# Patient Record
Sex: FEMALE | Race: OTHER | HISPANIC OR LATINO | Employment: FULL TIME | URBAN - METROPOLITAN AREA
[De-identification: names, ages, dates, MRNs, and addresses within clinical notes are randomized per-mention and may not be internally consistent; named-entity substitution may affect disease eponyms.]

---

## 2022-12-03 ENCOUNTER — HOSPITAL ENCOUNTER (EMERGENCY)
Facility: MEDICAL CENTER | Age: 42
End: 2022-12-03
Attending: EMERGENCY MEDICINE
Payer: COMMERCIAL

## 2022-12-03 VITALS
HEIGHT: 64 IN | TEMPERATURE: 98 F | OXYGEN SATURATION: 98 % | RESPIRATION RATE: 16 BRPM | BODY MASS INDEX: 26.12 KG/M2 | HEART RATE: 69 BPM | SYSTOLIC BLOOD PRESSURE: 113 MMHG | WEIGHT: 153 LBS | DIASTOLIC BLOOD PRESSURE: 59 MMHG

## 2022-12-03 DIAGNOSIS — R10.9 ABDOMINAL CRAMPS: ICD-10-CM

## 2022-12-03 DIAGNOSIS — R10.30 LOWER ABDOMINAL PAIN: ICD-10-CM

## 2022-12-03 DIAGNOSIS — N80.03 ADENOMYOSIS: ICD-10-CM

## 2022-12-03 LAB
ALBUMIN SERPL BCP-MCNC: 4 G/DL (ref 3.2–4.9)
ALBUMIN/GLOB SERPL: 1.5 G/DL
ALP SERPL-CCNC: 69 U/L (ref 30–99)
ALT SERPL-CCNC: 11 U/L (ref 2–50)
ANION GAP SERPL CALC-SCNC: 12 MMOL/L (ref 7–16)
APPEARANCE UR: CLEAR
AST SERPL-CCNC: 22 U/L (ref 12–45)
BACTERIA #/AREA URNS HPF: NEGATIVE /HPF
BASOPHILS # BLD AUTO: 0.4 % (ref 0–1.8)
BASOPHILS # BLD: 0.04 K/UL (ref 0–0.12)
BILIRUB SERPL-MCNC: 0.4 MG/DL (ref 0.1–1.5)
BILIRUB UR QL STRIP.AUTO: NEGATIVE
BUN SERPL-MCNC: 5 MG/DL (ref 8–22)
CALCIUM SERPL-MCNC: 8.9 MG/DL (ref 8.5–10.5)
CHLORIDE SERPL-SCNC: 107 MMOL/L (ref 96–112)
CO2 SERPL-SCNC: 21 MMOL/L (ref 20–33)
COLOR UR: YELLOW
CREAT SERPL-MCNC: 0.52 MG/DL (ref 0.5–1.4)
EOSINOPHIL # BLD AUTO: 0.21 K/UL (ref 0–0.51)
EOSINOPHIL NFR BLD: 2.3 % (ref 0–6.9)
EPI CELLS #/AREA URNS HPF: ABNORMAL /HPF
ERYTHROCYTE [DISTWIDTH] IN BLOOD BY AUTOMATED COUNT: 42.5 FL (ref 35.9–50)
GFR SERPLBLD CREATININE-BSD FMLA CKD-EPI: 120 ML/MIN/1.73 M 2
GLOBULIN SER CALC-MCNC: 2.7 G/DL (ref 1.9–3.5)
GLUCOSE SERPL-MCNC: 88 MG/DL (ref 65–99)
GLUCOSE UR STRIP.AUTO-MCNC: NEGATIVE MG/DL
HCG SERPL QL: NEGATIVE
HCT VFR BLD AUTO: 42 % (ref 37–47)
HGB BLD-MCNC: 14 G/DL (ref 12–16)
IMM GRANULOCYTES # BLD AUTO: 0.03 K/UL (ref 0–0.11)
IMM GRANULOCYTES NFR BLD AUTO: 0.3 % (ref 0–0.9)
KETONES UR STRIP.AUTO-MCNC: ABNORMAL MG/DL
LEUKOCYTE ESTERASE UR QL STRIP.AUTO: NEGATIVE
LIPASE SERPL-CCNC: 21 U/L (ref 11–82)
LYMPHOCYTES # BLD AUTO: 1.73 K/UL (ref 1–4.8)
LYMPHOCYTES NFR BLD: 19.2 % (ref 22–41)
MCH RBC QN AUTO: 29.7 PG (ref 27–33)
MCHC RBC AUTO-ENTMCNC: 33.3 G/DL (ref 33.6–35)
MCV RBC AUTO: 89 FL (ref 81.4–97.8)
MICRO URNS: ABNORMAL
MONOCYTES # BLD AUTO: 0.6 K/UL (ref 0–0.85)
MONOCYTES NFR BLD AUTO: 6.6 % (ref 0–13.4)
NEUTROPHILS # BLD AUTO: 6.42 K/UL (ref 2–7.15)
NEUTROPHILS NFR BLD: 71.2 % (ref 44–72)
NITRITE UR QL STRIP.AUTO: NEGATIVE
NRBC # BLD AUTO: 0 K/UL
NRBC BLD-RTO: 0 /100 WBC
PH UR STRIP.AUTO: 5.5 [PH] (ref 5–8)
PLATELET # BLD AUTO: 301 K/UL (ref 164–446)
PMV BLD AUTO: 9.1 FL (ref 9–12.9)
POTASSIUM SERPL-SCNC: 3.8 MMOL/L (ref 3.6–5.5)
PROCALCITONIN SERPL-MCNC: <0.05 NG/ML
PROT SERPL-MCNC: 6.7 G/DL (ref 6–8.2)
PROT UR QL STRIP: NEGATIVE MG/DL
RBC # BLD AUTO: 4.72 M/UL (ref 4.2–5.4)
RBC # URNS HPF: ABNORMAL /HPF
RBC UR QL AUTO: ABNORMAL
RENAL EPI CELLS #/AREA URNS HPF: NEGATIVE /HPF
SODIUM SERPL-SCNC: 140 MMOL/L (ref 135–145)
SP GR UR STRIP.AUTO: >=1.03
UROBILINOGEN UR STRIP.AUTO-MCNC: 0.2 MG/DL
WBC # BLD AUTO: 9 K/UL (ref 4.8–10.8)
WBC #/AREA URNS HPF: ABNORMAL /HPF

## 2022-12-03 PROCEDURE — 96374 THER/PROPH/DIAG INJ IV PUSH: CPT

## 2022-12-03 PROCEDURE — 83690 ASSAY OF LIPASE: CPT

## 2022-12-03 PROCEDURE — 700105 HCHG RX REV CODE 258: Performed by: EMERGENCY MEDICINE

## 2022-12-03 PROCEDURE — 84703 CHORIONIC GONADOTROPIN ASSAY: CPT

## 2022-12-03 PROCEDURE — 36415 COLL VENOUS BLD VENIPUNCTURE: CPT

## 2022-12-03 PROCEDURE — 84145 PROCALCITONIN (PCT): CPT

## 2022-12-03 PROCEDURE — 80053 COMPREHEN METABOLIC PANEL: CPT

## 2022-12-03 PROCEDURE — 96375 TX/PRO/DX INJ NEW DRUG ADDON: CPT

## 2022-12-03 PROCEDURE — 81001 URINALYSIS AUTO W/SCOPE: CPT

## 2022-12-03 PROCEDURE — 99285 EMERGENCY DEPT VISIT HI MDM: CPT

## 2022-12-03 PROCEDURE — 85025 COMPLETE CBC W/AUTO DIFF WBC: CPT

## 2022-12-03 PROCEDURE — 700111 HCHG RX REV CODE 636 W/ 250 OVERRIDE (IP): Performed by: EMERGENCY MEDICINE

## 2022-12-03 RX ORDER — ONDANSETRON 4 MG/1
4 TABLET, ORALLY DISINTEGRATING ORAL ONCE
Status: COMPLETED | OUTPATIENT
Start: 2022-12-03 | End: 2022-12-03

## 2022-12-03 RX ORDER — SODIUM CHLORIDE 9 MG/ML
1000 INJECTION, SOLUTION INTRAVENOUS ONCE
Status: COMPLETED | OUTPATIENT
Start: 2022-12-03 | End: 2022-12-03

## 2022-12-03 RX ORDER — NAPROXEN 500 MG/1
500 TABLET ORAL 2 TIMES DAILY WITH MEALS
Qty: 20 TABLET | Refills: 0 | Status: SHIPPED | OUTPATIENT
Start: 2022-12-03 | End: 2022-12-03 | Stop reason: SDUPTHER

## 2022-12-03 RX ORDER — NAPROXEN 500 MG/1
500 TABLET ORAL 2 TIMES DAILY WITH MEALS
Qty: 20 TABLET | Refills: 0 | Status: SHIPPED | OUTPATIENT
Start: 2022-12-03 | End: 2022-12-13

## 2022-12-03 RX ORDER — HYDROCODONE BITARTRATE AND ACETAMINOPHEN 5; 325 MG/1; MG/1
1 TABLET ORAL EVERY 8 HOURS PRN
Qty: 21 TABLET | Refills: 0 | Status: SHIPPED | OUTPATIENT
Start: 2022-12-03 | End: 2022-12-03 | Stop reason: SDUPTHER

## 2022-12-03 RX ORDER — ONDANSETRON 2 MG/ML
4 INJECTION INTRAMUSCULAR; INTRAVENOUS ONCE
Status: COMPLETED | OUTPATIENT
Start: 2022-12-03 | End: 2022-12-03

## 2022-12-03 RX ORDER — HYDROCODONE BITARTRATE AND ACETAMINOPHEN 5; 325 MG/1; MG/1
1 TABLET ORAL EVERY 8 HOURS PRN
Qty: 21 TABLET | Refills: 0 | Status: SHIPPED | OUTPATIENT
Start: 2022-12-03 | End: 2022-12-10

## 2022-12-03 RX ADMIN — SODIUM CHLORIDE 1000 ML: 9 INJECTION, SOLUTION INTRAVENOUS at 18:20

## 2022-12-03 RX ADMIN — ONDANSETRON 4 MG: 4 TABLET, ORALLY DISINTEGRATING ORAL at 21:50

## 2022-12-03 RX ADMIN — MORPHINE SULFATE 6 MG: 10 INJECTION INTRAVENOUS at 18:20

## 2022-12-03 RX ADMIN — ONDANSETRON 4 MG: 2 INJECTION INTRAMUSCULAR; INTRAVENOUS at 18:20

## 2022-12-04 NOTE — ED TRIAGE NOTES
"Chief Complaint   Patient presents with    Cramping     Pt c/o severe pelvic/abdominal cramping and pain due to her monthly cycle. Pt states she has adenomyosis and was seen recently at [facility] and given a prescription for a muscle relaxer and states muscle relaxers don't help. She states normal she is prescribed hydrocodone for her condition but states she needs something specifically for pain and has needed this since 2017 each month for her cycle.       Wheelchair to triage for above complaint.   Educated on triage process, encourage to inform staff of any changes.     /61   Pulse 71   Temp 36.7 °C (98.1 °F) (Temporal)   Resp 15   Ht 1.626 m (5' 4\")   Wt 69.4 kg (153 lb)   SpO2 99% Comment: RA  BMI 26.26 kg/m²     "

## 2022-12-04 NOTE — DISCHARGE INSTRUCTIONS
A condition in which endometrial tissue exists within and grows into the uterine wall.  Adenomyosis occurs most often late in the childbearing years and typically disappears after menopause.  Sometimes, adenomyosis may cause heavy or prolonged menstrual bleeding, severe cramping, pain during intercourse, or blood clots that pass during a period.

## 2022-12-04 NOTE — ED NOTES
Medication given per MAR. Discharge instructions discussed with pt, verbalizes understanding. PIV removed. Pt and family educated about prescription  locations. All belongings with pt when leaving unit. Pt to lobby by SHAYY.

## 2022-12-04 NOTE — ED PROVIDER NOTES
ED Provider Note    CHIEF COMPLAINT  Chief Complaint   Patient presents with    Cramping     Pt c/o severe pelvic/abdominal cramping and pain due to her monthly cycle. Pt states she has adenomyosis and was seen recently at [facility] and given a prescription for a muscle relaxer and states muscle relaxers don't help. She states normal she is prescribed hydrocodone for her condition but states she needs something specifically for pain and has needed this since 2017 each month for her cycle.     HPI  Ms. Garcia is a 42-year-old female with a past medical history of adenomyosis, currently presenting to the emergency room for concerns regarding worsening chronic lower abdominal discomfort.  The patient states that she had an extensive work-up over the last several years since she started having chronic lower abdominal pains in 2017.  She was previously established with [facility] in the health system there and had an MRI of the pelvis that eventually had a diagnosis of adenomyosis.  She has had cyclical pains for years with her periods and presents today currently on her period with worsening pain and discomfort.  She denies any fevers, denies any dysuria, denies any bowel changes.  She has had decreased p.o. intake because of the severity of her pain.  She was seen at the end of October for this similar pain and discomfort and says she was treated for endometriosis, which she says she does not have with tramadol.  She is try taking this but has not alleviated her pain or discomfort.  She had been previously established and [place] with an OB/GYN and was told that she likely would need a hysterectomy but has not followed up on this.  She is currently denying any syncope, no chest pain, shortness of breath, no easy bruising or bleeding gums and denies any other recent traumatic injuries.    Prior work-up from [place] was reviewed on 10/27/2022 the patient had unremarkable CMP, UA without signs of acute infectious  "etiology, negative pregnancy status, no leukocytosis or concerning anemia,    PPE Note: I personally donned full PPE for all patient encounters during this visit, including being clean-shaven with an N95 respirator mask, gloves, and goggles.       REVIEW OF SYSTEMS  See HPI for further details. All other systems are negative.     PAST MEDICAL HISTORY   adenomyosis    SOCIAL HISTORY  Social History     Tobacco Use    Smoking status: Never    Smokeless tobacco: Never   Vaping Use    Vaping Use: Some days   Substance and Sexual Activity    Alcohol use: Yes     Comment: occasional    Drug use: Not on file     Comment: marijuana occasionally    Sexual activity: Not on file     SURGICAL HISTORY   has a past surgical history that includes gastric bypass laparoscopic (2017).    CURRENT MEDICATIONS  Home Medications       Reviewed by Tracie Terrell R.N. (Registered Nurse) on 12/03/22 at 1701  Med List Status: Not Addressed     Medication Last Dose Status        Patient Jb Taking any Medications                           ALLERGIES  No Known Allergies    PHYSICAL EXAM  VITAL SIGNS: /61   Pulse 71   Temp 36.7 °C (98.1 °F) (Temporal)   Resp 15   Ht 1.626 m (5' 4\")   Wt 69.4 kg (153 lb)   SpO2 99% Comment: RA  BMI 26.26 kg/m²   Pulse ox interpretation: I interpret this pulse ox as normal.  Genl: F sitting in gurney uncomfortably, speaking clearly, appears in mild distress   Head: NC/AT   ENT: Mucous membranes dry, posterior pharynx clear, uvula midline, nares patent bilaterally   Eyes: Normal sclera, pupils equal round reactive to light  Neck: Supple, FROM, no LAD appreciated   Pulmonary: Lungs are clear to auscultation bilaterally  Chest: No TTP  CV:  RRR, no murmur appreciated, pulses 2+ in both upper and lower extremities,  Abdomen: soft, obese, nonspecific suprapubic discomfort, left lower quadrant discomfort, no distention, no rebound/guarding, no masses palpated, no HSM   : no CVA tenderness "   Musculoskeletal: Pain free ROM of the neck. Moving upper and lower extremities and spontaneous in coordinated fashion  Neuro: A&Ox4 (person, place, time, situation), speech fluent, gait steady, no focal deficits appreciated, No cerebellar signs. Sensation is grossly intact in the distal upper and lower extremities.  5/5 strength in  and dorsiflexion/plantar flexion of the ankles  Psych: Patient has an appropriate affect and behavior  Skin: No rash or lesions.  No pallor or jaundice.  No cyanosis.  Warm and dry.     DIAGNOSTIC STUDIES / PROCEDURES    LABS  Labs Reviewed   CBC WITH DIFFERENTIAL - Abnormal; Notable for the following components:       Result Value    MCHC 33.3 (*)     Lymphocytes 19.20 (*)     All other components within normal limits   COMP METABOLIC PANEL - Abnormal; Notable for the following components:    Bun 5 (*)     All other components within normal limits   LIPASE   URINALYSIS   HCG QUAL SERUM   PROCALCITONIN   ESTIMATED GFR   URINE MICROSCOPIC (W/UA)     RADIOLOGY  No orders to display     COURSE & MEDICAL DECISION MAKING  Pertinent Labs & Imaging studies reviewed. (See chart for details)    DDX: Pregnancy, ectopic, UTI, adenomyosis, endometriosis, torsion, ovarian cyst, pancreatitis, cholelithiasis, cholecystitis, viral syndrome, constipation.    [facility]  Initial evaluation at 1759:  Patient is seen and evaluated for symptoms as described above.  The patient has a longstanding history of chronic abdominal discomfort and at this time she is not peritoneal, she is not febrile nor she have pain out of proportion to her previous episodes of cyclical abdominal discomfort.  Patient has a bedside MRI is reviewed along with previous ultrasounds and CT scans where she has established leiomyomata and adenomyosis from her outside facilities.  She had been on anti-inflammatory medications without acute pain relief and at this time when I discussed the possibility of need to do a acute ultrasound to  evaluate for impaired blood flow to the ovaries, possible ovarian cysts or other structural changes inside the uterus she was very reticent to have this performed as she has had multiple ones in the past and did not not want an informed discussion and as this is at her request we will work with pain medications, give some fluids and reassess her pain and discomfort thereafter.  She does not have a leukocytosis, no concerning anemia, she does not have a leftward shift or bandemia and she has no other gross abnormalities on her CMP to suggest abdominal pain hepatitis, LFT obstructive pathologies or pancreatitis.  Her procalcitonin is also reassuring and its not elevated.  Urinalysis was delayed as the patient was not feeling like she needed to urinate however after she received fluids this was then collected.  Pregnancy test is currently negative.  I went back to the bedside and had an extensive discussion with the patient.  She plans on reestablishing with her previous gynecologist who recommended likely hysterectomy and she will discuss other further options or treatments hormone therapy as this has been previously explored and the patient does wish to attempt to get pregnant.    Patient does not have any evolving abdominal disc discomfort, she remains afebrile and with reassuring vital signs at this time she will be discharged home with strict return precautions, breakthrough pain medications as needed for her chronic abdominal discomfort secondary to fibroids/adenomyosis.  I have reviewed the patient's chart from [facility] and there are no findings concerning for multiple prescriptions or other risk factors for prescribing narcotics.  I do believe this will be beneficial as anti-inflammatories have not been adequate for the patient's pain needs she will have a restricted amount because of their timing with her cycle.    HYDRATION: Based on the patient's presentation of Dehydration and Inability to take oral fluids the  patient was given IV fluids. IV Hydration was used because oral hydration was not adequate alone. Upon recheck following hydration, the patient was improved.    FINAL IMPRESSION  Visit Diagnoses     ICD-10-CM   1. Abdominal cramps  R10.9   2. Lower abdominal pain  R10.30   3. Adenomyosis  N80.03     Electronically signed by: Monster Albert M.D., 12/3/2022 5:59 PM

## 2023-01-02 ENCOUNTER — HOSPITAL ENCOUNTER (EMERGENCY)
Facility: MEDICAL CENTER | Age: 43
End: 2023-01-02
Attending: EMERGENCY MEDICINE
Payer: COMMERCIAL

## 2023-01-02 VITALS
SYSTOLIC BLOOD PRESSURE: 114 MMHG | BODY MASS INDEX: 27.21 KG/M2 | HEIGHT: 64 IN | WEIGHT: 159.39 LBS | OXYGEN SATURATION: 96 % | DIASTOLIC BLOOD PRESSURE: 59 MMHG | TEMPERATURE: 97.5 F | HEART RATE: 66 BPM | RESPIRATION RATE: 16 BRPM

## 2023-01-02 DIAGNOSIS — B00.52 HERPES KERATITIS OF LEFT EYE: ICD-10-CM

## 2023-01-02 PROCEDURE — 65205 REMOVE FOREIGN BODY FROM EYE: CPT

## 2023-01-02 PROCEDURE — 99284 EMERGENCY DEPT VISIT MOD MDM: CPT

## 2023-01-02 PROCEDURE — 700101 HCHG RX REV CODE 250: Performed by: EMERGENCY MEDICINE

## 2023-01-02 RX ORDER — VALACYCLOVIR HYDROCHLORIDE 1 G/1
500 TABLET, FILM COATED ORAL 3 TIMES DAILY
Qty: 15 TABLET | Refills: 0 | Status: SHIPPED | OUTPATIENT
Start: 2023-01-02 | End: 2023-01-12

## 2023-01-02 RX ORDER — PROPARACAINE HYDROCHLORIDE 5 MG/ML
1 SOLUTION/ DROPS OPHTHALMIC ONCE
Status: COMPLETED | OUTPATIENT
Start: 2023-01-02 | End: 2023-01-02

## 2023-01-02 RX ADMIN — FLUORESCEIN SODIUM 1 MG: 1 STRIP OPHTHALMIC at 15:00

## 2023-01-02 RX ADMIN — PROPARACAINE HYDROCHLORIDE 1 DROP: 5 SOLUTION/ DROPS OPHTHALMIC at 15:00

## 2023-01-02 ASSESSMENT — FIBROSIS 4 INDEX: FIB4 SCORE: 0.88

## 2023-01-02 NOTE — DISCHARGE INSTRUCTIONS
Please follow-up with Dr. Guillen tomorrow (ophthalmology).  His direct number is [phone].  Start your antivirals (valacyclovir).

## 2023-01-02 NOTE — ED PROVIDER NOTES
ER Provider Note    Scribed for Garrett River M.d. by Ian Fonseca. 1/2/2023  2:32 PM    Primary Care Provider: Pcp Not noted  Means of arrival: Walk-in  History obtained from: Patient  History limited by: None     CHIEF COMPLAINT  Chief Complaint   Patient presents with    Eye Problem     Pt complains of Lt eye irritation x1 week. Pt states she was seen at  for same issue but Pt states that prescribed medication has not helped and that eye has become more and more painful. Light sensitivity, redness, watering, consistent pain. Pt states she noticed 3 black dots in her vision.     EXTERNAL RECORDS REVIEWED  Select: Other None    HPI  LIMITATION TO HISTORY   Select: : None  OUTSIDE HISTORIAN(S):  Select: Family members at bedside    Keerthi Pearl is a 42 y.o. female who presents to the ED complaining for Left eye pain onset two weeks ago. She fells like something got in her eye two Fridays ago but she has been wearing her contact lenses. Per patient, they went to [place] to have her eye inspected. She was treated with eye drops. Doctors thought it was related to her eye contacts and they told her if the eye drops do not work, she is to present to the Emergency room. Additionally, she notes there are dots in her left eye that she can see in the mirror. She has associated left eye photophobia and head ache,  but denies fever. No alleviating or exacerbating factors reported.  Denies any other medical problems.       REVIEW OF SYSTEMS  Pertinent positives include left eye pain, left eye photophobia, and head ache. Pertinent negatives include no fever.      PAST MEDICAL HISTORY  None noted     SURGICAL HISTORY  Past Surgical History:   Procedure Laterality Date    GASTRIC BYPASS LAPAROSCOPIC  2017       FAMILY HISTORY  None noted     SOCIAL HISTORY   reports that she has never smoked. She has never used smokeless tobacco. She reports current alcohol use.  Drug: Inhaled.    CURRENT MEDICATIONS  No current  "outpatient medications.    ALLERGIES  Nsaids    PHYSICAL EXAM  /62   Pulse 82   Temp 36.3 °C (97.3 °F) (Temporal)   Resp 17   Ht 1.626 m (5' 4.02\")   Wt 72.3 kg (159 lb 6.3 oz)   SpO2 99%   BMI 27.35 kg/m²   Constitutional: Well developed, Well nourished, No acute distress, Non-toxic appearance.   HENT: Multiple areas of uptake mostly central towards the 1 to 3 o'clock position, Circular whitish areas in clusters towards the central vision, Does have consensual photophobia, Conjunctiva is injected and has clear drainage, Difficult to access cell or flair, Extraocular movements intact, [place] pressure is 22. Normocephalic, Atraumatic  Cardiovascular: Regular pulse  Lungs: No respiratory distress  Skin: Warm, Dry, no rash  Extremities: No edema  Neurologic: Alert, appropriate, follows commands  Psychiatric: Affect normal      DIAGNOSTIC STUDIES    Slit Lamp Procedure    Indication: Suspected foreign body                   Procedure: The patient's head was positioned appropriately to provide adequate exposure of the left eye. Fluorescein staining was performed in the left eye which revealed the same patchy uptake noted.. The patient's head was positioned appropriately and the foreign body was unable to be removed.    The patient tolerated the procedure well.    Complications: None    COURSE & MEDICAL DECISION MAKING     Nursing notes, vital signs, PMSFSHx reviewed in chart     ED Observation Status? No; Patient does not meet criteria for ED Observation.     INITIAL ASSESSMENT AND PLAN    Escalation of care considered, and ultimately not performed: after discussion with the patient / family, they have elected to decline an escalation in care.    Barriers to care at this time, including but not limited to: Select: None .     Diagnostic tests and prescription drugs considered including, but not limited to: Select: None .    2:30 PM - Treated patient with Opthaine 0.5% ophthalmic solution 1 drop and " fluorescein ophthalmic strip 1 mg.    2:35 PM - Patient evaluated at bedside. I informed the patient that I will consult with an eye doctor. Patient verbalizes understanding and agreement to this plan of care. Differential diagnoses include but not limited to: iridis, corneal ulcer, viral infection, bacterial infection.     2:55 PM -  I discussed the patient's case and the above findings with Dr. Guillen (Ophthalmology) who informed me to start the patient on valacyclovir 500 mg PO. He told me to contact him at [phone] for any further updates.    3:11 PM - Patient was Reevaluated at bedside. I informed the patient that  she is to follow up with Dr. Guillen tomorrow . I then informed them of plans for discharge. Patient had the opportunity to ask any questions. The plan for discharge was discussed with them and she was told to return for any new or worsening symptoms. She was also informed of the plans for follow up. Patient is understanding and agreeable to the plan for discharge. .    FINAL PROBLEM LIST AND DISPOSITION    This is a 42-year-old female who presents after being treated for a week and a half with antibiotic eyedrops for presumptive conjunctivitis.  However today the patient has multiple circular patchy areas of uptake on the cornea-she is a contact lens wears but I doubt that these are all corneal ulcers.  I suspect that this may be a viral herpetic infection.  Case discussed with Dr. Guillen who recommended valacyclovir and he will see the patient tomorrow and decide on steroids and/or further treatment.    In addition to the chief complaint, the following problems were addressed: None    I have discussed management of the patient with the following physicians and DELMA's:  Dr. Guillen    Discussion of management with other [facility] or appropriate source(s): Select: None     The patient will return for new or worsening symptoms and is stable at the time of discharge.    The patient is referred to a  primary physician for blood pressure management, diabetic screening, and for all other preventative health concerns.    DISPOSITION:  Patient will be discharged home in stable condition.    FOLLOW UP:  Domenic Guillen M.D.  [address]  [address]  [place] [id number] [phone]    Schedule an appointment as soon as possible for a visit in 1 day        OUTPATIENT MEDICATIONS:  New Prescriptions    VALACYCLOVIR (VALTREX) 1 GM TAB    Take 0.5 Tablets by mouth 3 times a day for 10 days.        FINAL IMPRESSION   1. Herpes keratitis of left eye         Ian MORA (Jimy), am scribing for, and in the presence of, Garrett River M.D..    Electronically signed by: Ian Fonseca (Jimy), 1/2/2023    Garrett MORA M.D. personally performed the services described in this documentation, as scribed by Ian Fonseca in my presence, and it is both accurate and complete. E      The note accurately reflects work and decisions made by me.  Garrett River M.D.  1/2/2023  3:17 PM

## 2023-01-02 NOTE — ED TRIAGE NOTES
Chief Complaint   Patient presents with    Eye Problem     Pt complains of Lt eye irritation x1 week. Pt states she was seen at  for same issue but Pt states that prescribed medication has not helped and that eye has become more and more painful. Light sensitivity, redness, watering, consistent pain. Pt states she noticed 3 black dots in her vision.     Pt educated upon triage process and told to inform  staff of any changes in condition so that Pt may be reassessed. No further questions at this time. Pt sitting out in lobby.

## 2025-05-12 ENCOUNTER — HOSPITAL ENCOUNTER (EMERGENCY)
Facility: MEDICAL CENTER | Age: 45
End: 2025-05-12
Attending: EMERGENCY MEDICINE
Payer: COMMERCIAL

## 2025-05-12 VITALS
DIASTOLIC BLOOD PRESSURE: 62 MMHG | HEART RATE: 64 BPM | HEIGHT: 64 IN | SYSTOLIC BLOOD PRESSURE: 114 MMHG | TEMPERATURE: 98 F | OXYGEN SATURATION: 94 % | BODY MASS INDEX: 24.54 KG/M2 | RESPIRATION RATE: 18 BRPM | WEIGHT: 143.74 LBS

## 2025-05-12 DIAGNOSIS — E34.9 HORMONE IMBALANCE: ICD-10-CM

## 2025-05-12 DIAGNOSIS — Z87.42 HISTORY OF ENDOMETRIOSIS: ICD-10-CM

## 2025-05-12 DIAGNOSIS — R10.2 ACUTE PELVIC PAIN: ICD-10-CM

## 2025-05-12 DIAGNOSIS — F41.0 PANIC ATTACK: ICD-10-CM

## 2025-05-12 LAB
ALBUMIN SERPL BCP-MCNC: 4.1 G/DL (ref 3.2–4.9)
ALBUMIN/GLOB SERPL: 1.4 G/DL
ALP SERPL-CCNC: 63 U/L (ref 30–99)
ALT SERPL-CCNC: 12 U/L (ref 2–50)
ANION GAP SERPL CALC-SCNC: 13 MMOL/L (ref 7–16)
AST SERPL-CCNC: 19 U/L (ref 12–45)
BASOPHILS # BLD AUTO: 0.4 % (ref 0–1.8)
BASOPHILS # BLD: 0.03 K/UL (ref 0–0.12)
BILIRUB SERPL-MCNC: 0.6 MG/DL (ref 0.1–1.5)
BUN SERPL-MCNC: 9 MG/DL (ref 8–22)
CALCIUM ALBUM COR SERPL-MCNC: 8.9 MG/DL (ref 8.5–10.5)
CALCIUM SERPL-MCNC: 9 MG/DL (ref 8.5–10.5)
CHLORIDE SERPL-SCNC: 104 MMOL/L (ref 96–112)
CO2 SERPL-SCNC: 20 MMOL/L (ref 20–33)
CREAT SERPL-MCNC: 0.6 MG/DL (ref 0.5–1.4)
EOSINOPHIL # BLD AUTO: 0.29 K/UL (ref 0–0.51)
EOSINOPHIL NFR BLD: 3.6 % (ref 0–6.9)
ERYTHROCYTE [DISTWIDTH] IN BLOOD BY AUTOMATED COUNT: 43.8 FL (ref 35.9–50)
GFR SERPLBLD CREATININE-BSD FMLA CKD-EPI: 114 ML/MIN/1.73 M 2
GLOBULIN SER CALC-MCNC: 2.9 G/DL (ref 1.9–3.5)
GLUCOSE SERPL-MCNC: 93 MG/DL (ref 65–99)
HCT VFR BLD AUTO: 40.1 % (ref 37–47)
HGB BLD-MCNC: 13.3 G/DL (ref 12–16)
IMM GRANULOCYTES # BLD AUTO: 0.03 K/UL (ref 0–0.11)
IMM GRANULOCYTES NFR BLD AUTO: 0.4 % (ref 0–0.9)
LYMPHOCYTES # BLD AUTO: 1.37 K/UL (ref 1–4.8)
LYMPHOCYTES NFR BLD: 16.9 % (ref 22–41)
MCH RBC QN AUTO: 27.9 PG (ref 27–33)
MCHC RBC AUTO-ENTMCNC: 33.2 G/DL (ref 32.2–35.5)
MCV RBC AUTO: 84.2 FL (ref 81.4–97.8)
MONOCYTES # BLD AUTO: 0.58 K/UL (ref 0–0.85)
MONOCYTES NFR BLD AUTO: 7.1 % (ref 0–13.4)
NEUTROPHILS # BLD AUTO: 5.82 K/UL (ref 1.82–7.42)
NEUTROPHILS NFR BLD: 71.6 % (ref 44–72)
NRBC # BLD AUTO: 0 K/UL
NRBC BLD-RTO: 0 /100 WBC (ref 0–0.2)
PLATELET # BLD AUTO: 334 K/UL (ref 164–446)
PMV BLD AUTO: 9.7 FL (ref 9–12.9)
POTASSIUM SERPL-SCNC: 3.6 MMOL/L (ref 3.6–5.5)
PROT SERPL-MCNC: 7 G/DL (ref 6–8.2)
RBC # BLD AUTO: 4.76 M/UL (ref 4.2–5.4)
SODIUM SERPL-SCNC: 137 MMOL/L (ref 135–145)
WBC # BLD AUTO: 8.1 K/UL (ref 4.8–10.8)

## 2025-05-12 PROCEDURE — 700111 HCHG RX REV CODE 636 W/ 250 OVERRIDE (IP): Mod: JZ | Performed by: EMERGENCY MEDICINE

## 2025-05-12 PROCEDURE — 80053 COMPREHEN METABOLIC PANEL: CPT

## 2025-05-12 PROCEDURE — 36415 COLL VENOUS BLD VENIPUNCTURE: CPT

## 2025-05-12 PROCEDURE — 96374 THER/PROPH/DIAG INJ IV PUSH: CPT

## 2025-05-12 PROCEDURE — 85025 COMPLETE CBC W/AUTO DIFF WBC: CPT

## 2025-05-12 PROCEDURE — 700105 HCHG RX REV CODE 258: Performed by: EMERGENCY MEDICINE

## 2025-05-12 PROCEDURE — 99284 EMERGENCY DEPT VISIT MOD MDM: CPT

## 2025-05-12 RX ORDER — SODIUM CHLORIDE 9 MG/ML
1000 INJECTION, SOLUTION INTRAVENOUS ONCE
Status: COMPLETED | OUTPATIENT
Start: 2025-05-12 | End: 2025-05-12

## 2025-05-12 RX ORDER — ONDANSETRON 2 MG/ML
4 INJECTION INTRAMUSCULAR; INTRAVENOUS ONCE
Status: DISCONTINUED | OUTPATIENT
Start: 2025-05-12 | End: 2025-05-12 | Stop reason: HOSPADM

## 2025-05-12 RX ORDER — KETOROLAC TROMETHAMINE 15 MG/ML
15 INJECTION, SOLUTION INTRAMUSCULAR; INTRAVENOUS ONCE
Status: COMPLETED | OUTPATIENT
Start: 2025-05-12 | End: 2025-05-12

## 2025-05-12 RX ORDER — MORPHINE SULFATE 4 MG/ML
4 INJECTION INTRAVENOUS ONCE
Status: DISCONTINUED | OUTPATIENT
Start: 2025-05-12 | End: 2025-05-12 | Stop reason: HOSPADM

## 2025-05-12 RX ADMIN — KETOROLAC TROMETHAMINE 15 MG: 15 INJECTION, SOLUTION INTRAMUSCULAR; INTRAVENOUS at 04:25

## 2025-05-12 RX ADMIN — SODIUM CHLORIDE 1000 ML: 9 INJECTION, SOLUTION INTRAVENOUS at 04:20

## 2025-05-12 ASSESSMENT — FIBROSIS 4 INDEX: FIB4 SCORE: 0.78

## 2025-05-12 ASSESSMENT — PAIN DESCRIPTION - DESCRIPTORS: DESCRIPTORS: CRAMPING

## 2025-05-12 NOTE — ED NOTES
Discharge instructions given and discussed.  patient educated to come back to ER for new or worsening symptoms. Instructed to follow up with PCP. Patient verbalized understanding. All IV's removed. GCS of 15. Pt assisted out in wc walks to car with steady gait

## 2025-05-12 NOTE — Clinical Note
Keerthi Pearl was seen and treated in our emergency department on 5/12/2025.  She may return to work on 05/14/2025.       If you have any questions or concerns, please don't hesitate to call.      Jennifer Boggs D.O.

## 2025-05-12 NOTE — DISCHARGE INSTRUCTIONS
Call your OB/GYN today to schedule an appointment for further evaluation of possible hormone imbalance and your chronic pelvic pain.

## 2025-05-12 NOTE — ED TRIAGE NOTES
"Chief Complaint   Patient presents with    Abdominal Pain     PT walked in with  daughter. Pt c/o hx of endometriosis and started her cycle yesterday. Pt with increasing anxiety and pain  and SOB with increaseed resp. Rate.     /73   Pulse 75   Temp 36.6 °C (97.8 °F) (Temporal)   Resp (!) 22   Ht 1.626 m (5' 4\")   Wt 65.2 kg (143 lb 11.8 oz)   LMP 05/11/2025 (Exact Date)   SpO2 96%   BMI 24.67 kg/m²     "

## 2025-05-12 NOTE — ED PROVIDER NOTES
ED Provider Note    CHIEF COMPLAINT  Chief Complaint   Patient presents with    Abdominal Pain     PT walked in with  daughter. Pt c/o hx of endometriosis and started her cycle yesterday. Pt with increasing anxiety and pain  and SOB with increaseed resp. Rate.       EXTERNAL RECORDS REVIEWED  Care everywhere patient has been seen primarily at [facility] for low abdominal pain/pelvic pain/dysmenorrhea .    HPI/ROS  LIMITATION TO HISTORY   Select: : None  OUTSIDE HISTORIAN(S):  Family daughter    Keerthi Pearl is a 44 y.o. female who presents tonight with her daughter with a chief complaint of increased abdominal pain, anxiety, hyperventilation.  Patient has a history of endometriosis and chronic pelvic pain with dysmenorrhea.  She is normally seen by advanced OB/GYN.  The patient denies any nausea, vomiting, fever, dysuria.  She just had an endometrial ablation performed in [place] 2 months ago and had a painless menses last month.  She started her menstrual cycle yesterday and now has waxing and waning severe crampy pain.  She became very anxious and started hyperventilating with a panic attack.  They had gone to [facility] just prior to arrival here but the wait was too long so they came here to be seen.  Patient has minimal vaginal bleeding at this time.    PAST MEDICAL HISTORY   Endometriosis, dysmenorrhea, chronic pelvic pain    SURGICAL HISTORY   has a past surgical history that includes gastric bypass laparoscopic (2017).  Endometrial ablation in [place] 2025      FAMILY HISTORY  History reviewed. No pertinent family history.    SOCIAL HISTORY  Social History     Tobacco Use    Smoking status: Never    Smokeless tobacco: Never   Vaping Use    Vaping status: Some Days    Substances: CBD    Devices: Disposable   Substance and Sexual Activity    Alcohol use: Yes     Comment: occasional    Drug use: Not on file     Comment: marijuana occasionally    Sexual  "activity: Not on file       CURRENT MEDICATIONS  Home Medications    **Home medications have not yet been reviewed for this encounter**         ALLERGIES  Allergies   Allergen Reactions    Nsaids      Hx gastric bypass       PHYSICAL EXAM  VITAL SIGNS: /62   Pulse 64   Temp 36.7 °C (98 °F) (Temporal)   Resp 18   Ht 1.626 m (5' 4\")   Wt 65.2 kg (143 lb 11.8 oz)   LMP 05/11/2025 (Exact Date)   SpO2 94%   BMI 24.67 kg/m²    Constitutional: Patient is well developed, well nourished.  Extremely anxious, hyperventilating, very emotionally labile, speaking normal 1 minute in the next minute crying.  Moderate distress.  HENT: Normocephalic,  Nose normal with no mucosal edema or drainage. Oropharynx moist without erythema or exudates.  Eyes: PERRL, EOMI, Conjunctiva without erythema  Neck: Supple   Cardiovascular: Normal heart rate and Regular rhythm. No murmur  Thorax & Lungs: Clear and equal breath sounds with good excursion.  Patient is hyperventilating.  No rhonchi, wheezing.  Abdomen: Bowel sounds normal in all four quadrants. Soft, low pelvic tenderness on palpation, no rebound, guarding or flank tenderness.  Skin: Warm, Dry  Extremities: Peripheral pulses 4/4 No edema  Musculoskeletal: Normal range of motion in all major joints. No tenderness  Neurologic: Alert & oriented x 3, Normal motor function  Psychiatric: Affect extremely anxious, emotionally labile    EKG/LABS  Results for orders placed or performed during the hospital encounter of 05/12/25   CBC WITH DIFFERENTIAL    Collection Time: 05/12/25  4:05 AM   Result Value Ref Range    WBC 8.1 4.8 - 10.8 K/uL    RBC 4.76 4.20 - 5.40 M/uL    Hemoglobin 13.3 12.0 - 16.0 g/dL    Hematocrit 40.1 37.0 - 47.0 %    MCV 84.2 81.4 - 97.8 fL    MCH 27.9 27.0 - 33.0 pg    MCHC 33.2 32.2 - 35.5 g/dL    RDW 43.8 35.9 - 50.0 fL    Platelet Count 334 164 - 446 K/uL    MPV 9.7 9.0 - 12.9 fL    Neutrophils-Polys 71.60 44.00 - 72.00 %    Lymphocytes 16.90 (L) 22.00 - " 41.00 %    Monocytes 7.10 0.00 - 13.40 %    Eosinophils 3.60 0.00 - 6.90 %    Basophils 0.40 0.00 - 1.80 %    Immature Granulocytes 0.40 0.00 - 0.90 %    Nucleated RBC 0.00 0.00 - 0.20 /100 WBC    Neutrophils (Absolute) 5.82 1.82 - 7.42 K/uL    Lymphs (Absolute) 1.37 1.00 - 4.80 K/uL    Monos (Absolute) 0.58 0.00 - 0.85 K/uL    Eos (Absolute) 0.29 0.00 - 0.51 K/uL    Baso (Absolute) 0.03 0.00 - 0.12 K/uL    Immature Granulocytes (abs) 0.03 0.00 - 0.11 K/uL    NRBC (Absolute) 0.00 K/uL   COMP METABOLIC PANEL    Collection Time: 05/12/25  4:05 AM   Result Value Ref Range    Sodium 137 135 - 145 mmol/L    Potassium 3.6 3.6 - 5.5 mmol/L    Chloride 104 96 - 112 mmol/L    Co2 20 20 - 33 mmol/L    Anion Gap 13.0 7.0 - 16.0    Glucose 93 65 - 99 mg/dL    Bun 9 8 - 22 mg/dL    Creatinine 0.60 0.50 - 1.40 mg/dL    Calcium 9.0 8.5 - 10.5 mg/dL    Correct Calcium 8.9 8.5 - 10.5 mg/dL    AST(SGOT) 19 12 - 45 U/L    ALT(SGPT) 12 2 - 50 U/L    Alkaline Phosphatase 63 30 - 99 U/L    Total Bilirubin 0.6 0.1 - 1.5 mg/dL    Albumin 4.1 3.2 - 4.9 g/dL    Total Protein 7.0 6.0 - 8.2 g/dL    Globulin 2.9 1.9 - 3.5 g/dL    A-G Ratio 1.4 g/dL   ESTIMATED GFR    Collection Time: 05/12/25  4:05 AM   Result Value Ref Range    GFR (CKD-EPI) 114 >60 mL/min/1.73 m 2          COURSE & MEDICAL DECISION MAKING    ASSESSMENT, COURSE AND PLAN  Care Narrative: Patient received an IV and was given normal saline at the request of the patient's daughter.  Laboratories were drawn, patient was given Toradol 15 mg IV push.  I initially offered her morphine and Zofran but she states that Zofran constipates her.  I will hold on any narcotics and just give her Toradol for now until I see her laboratories.  Laboratories were all completely unremarkable.  Patient was markedly improved after the Toradol.    My discussion with she and her daughter is that she needs to follow-up with her gynecologist within the week for recheck, possible hormone replacement and  treatment for her dysmenorrhea.    She will be discharged home to follow-up with them within the next 1 to 2 days for recheck.    Hydration: Based on the patient's presentation of Inability to take oral fluids the patient was given IV fluids. IV Hydration was used because oral hydration was not as rapid as required. Upon recheck following hydration, the patient was improved.          ADDITIONAL PROBLEMS MANAGED  Anxiety    DISPOSITION AND DISCUSSIONS  I have discussed management of the patient with the following physicians and DELMA's: None    Discussion of management with other \[facility] [place] [facility]\"" or appropriate source(s): None     Barriers to care at this time, including but not limited to: None.     Decision tools and prescription drugs considered including, but not limited to: Follow-up with your gynecologist..    FINAL DIAGNOSIS  1. Acute pelvic pain    2. History of endometriosis    3. Hormone imbalance    4. Panic attack         Electronically signed by: Jennifer Boggs D.O., 5/12/2025 4:39 AM

## 2025-06-16 ENCOUNTER — HOSPITAL ENCOUNTER (EMERGENCY)
Facility: MEDICAL CENTER | Age: 45
End: 2025-06-16
Attending: STUDENT IN AN ORGANIZED HEALTH CARE EDUCATION/TRAINING PROGRAM
Payer: COMMERCIAL

## 2025-06-16 VITALS
DIASTOLIC BLOOD PRESSURE: 80 MMHG | RESPIRATION RATE: 20 BRPM | BODY MASS INDEX: 24.67 KG/M2 | SYSTOLIC BLOOD PRESSURE: 127 MMHG | OXYGEN SATURATION: 96 % | TEMPERATURE: 98.5 F | HEART RATE: 63 BPM | WEIGHT: 143.74 LBS

## 2025-06-16 DIAGNOSIS — R10.30 LOWER ABDOMINAL PAIN: ICD-10-CM

## 2025-06-16 DIAGNOSIS — N80.9 ENDOMETRIOSIS: Primary | ICD-10-CM

## 2025-06-16 LAB
ALBUMIN SERPL BCP-MCNC: 3.9 G/DL (ref 3.2–4.9)
ALBUMIN/GLOB SERPL: 1.4 G/DL
ALP SERPL-CCNC: 69 U/L (ref 30–99)
ALT SERPL-CCNC: 9 U/L (ref 2–50)
ANION GAP SERPL CALC-SCNC: 12 MMOL/L (ref 7–16)
AST SERPL-CCNC: 16 U/L (ref 12–45)
BASOPHILS # BLD AUTO: 0.4 % (ref 0–1.8)
BASOPHILS # BLD: 0.03 K/UL (ref 0–0.12)
BILIRUB SERPL-MCNC: 0.6 MG/DL (ref 0.1–1.5)
BUN SERPL-MCNC: 7 MG/DL (ref 8–22)
CALCIUM ALBUM COR SERPL-MCNC: 8.8 MG/DL (ref 8.5–10.5)
CALCIUM SERPL-MCNC: 8.7 MG/DL (ref 8.5–10.5)
CHLORIDE SERPL-SCNC: 107 MMOL/L (ref 96–112)
CO2 SERPL-SCNC: 19 MMOL/L (ref 20–33)
CREAT SERPL-MCNC: 0.63 MG/DL (ref 0.5–1.4)
EOSINOPHIL # BLD AUTO: 0.48 K/UL (ref 0–0.51)
EOSINOPHIL NFR BLD: 5.8 % (ref 0–6.9)
ERYTHROCYTE [DISTWIDTH] IN BLOOD BY AUTOMATED COUNT: 44.1 FL (ref 35.9–50)
GFR SERPLBLD CREATININE-BSD FMLA CKD-EPI: 113 ML/MIN/1.73 M 2
GLOBULIN SER CALC-MCNC: 2.8 G/DL (ref 1.9–3.5)
GLUCOSE SERPL-MCNC: 94 MG/DL (ref 65–99)
HCG SERPL QL: NEGATIVE
HCT VFR BLD AUTO: 39.3 % (ref 37–47)
HGB BLD-MCNC: 13 G/DL (ref 12–16)
IMM GRANULOCYTES # BLD AUTO: 0.01 K/UL (ref 0–0.11)
IMM GRANULOCYTES NFR BLD AUTO: 0.1 % (ref 0–0.9)
LYMPHOCYTES # BLD AUTO: 2.56 K/UL (ref 1–4.8)
LYMPHOCYTES NFR BLD: 30.9 % (ref 22–41)
MCH RBC QN AUTO: 27.8 PG (ref 27–33)
MCHC RBC AUTO-ENTMCNC: 33.1 G/DL (ref 32.2–35.5)
MCV RBC AUTO: 84 FL (ref 81.4–97.8)
MONOCYTES # BLD AUTO: 0.73 K/UL (ref 0–0.85)
MONOCYTES NFR BLD AUTO: 8.8 % (ref 0–13.4)
NEUTROPHILS # BLD AUTO: 4.48 K/UL (ref 1.82–7.42)
NEUTROPHILS NFR BLD: 54 % (ref 44–72)
NRBC # BLD AUTO: 0 K/UL
NRBC BLD-RTO: 0 /100 WBC (ref 0–0.2)
PLATELET # BLD AUTO: 347 K/UL (ref 164–446)
PMV BLD AUTO: 9.2 FL (ref 9–12.9)
POTASSIUM SERPL-SCNC: 3.7 MMOL/L (ref 3.6–5.5)
PROT SERPL-MCNC: 6.7 G/DL (ref 6–8.2)
RBC # BLD AUTO: 4.68 M/UL (ref 4.2–5.4)
SODIUM SERPL-SCNC: 138 MMOL/L (ref 135–145)
WBC # BLD AUTO: 8.3 K/UL (ref 4.8–10.8)

## 2025-06-16 PROCEDURE — 700111 HCHG RX REV CODE 636 W/ 250 OVERRIDE (IP): Mod: JZ | Performed by: STUDENT IN AN ORGANIZED HEALTH CARE EDUCATION/TRAINING PROGRAM

## 2025-06-16 PROCEDURE — 99285 EMERGENCY DEPT VISIT HI MDM: CPT

## 2025-06-16 PROCEDURE — 84703 CHORIONIC GONADOTROPIN ASSAY: CPT

## 2025-06-16 PROCEDURE — 85025 COMPLETE CBC W/AUTO DIFF WBC: CPT

## 2025-06-16 PROCEDURE — 96361 HYDRATE IV INFUSION ADD-ON: CPT

## 2025-06-16 PROCEDURE — 96374 THER/PROPH/DIAG INJ IV PUSH: CPT

## 2025-06-16 PROCEDURE — 36415 COLL VENOUS BLD VENIPUNCTURE: CPT

## 2025-06-16 PROCEDURE — 96375 TX/PRO/DX INJ NEW DRUG ADDON: CPT

## 2025-06-16 PROCEDURE — 700105 HCHG RX REV CODE 258: Performed by: STUDENT IN AN ORGANIZED HEALTH CARE EDUCATION/TRAINING PROGRAM

## 2025-06-16 PROCEDURE — 80053 COMPREHEN METABOLIC PANEL: CPT

## 2025-06-16 RX ORDER — ONDANSETRON 2 MG/ML
4 INJECTION INTRAMUSCULAR; INTRAVENOUS ONCE
Status: COMPLETED | OUTPATIENT
Start: 2025-06-16 | End: 2025-06-16

## 2025-06-16 RX ORDER — SODIUM CHLORIDE, SODIUM LACTATE, POTASSIUM CHLORIDE, AND CALCIUM CHLORIDE .6; .31; .03; .02 G/100ML; G/100ML; G/100ML; G/100ML
1000 INJECTION, SOLUTION INTRAVENOUS ONCE
Status: COMPLETED | OUTPATIENT
Start: 2025-06-16 | End: 2025-06-16

## 2025-06-16 RX ORDER — MORPHINE SULFATE 15 MG/1
7.5 TABLET ORAL EVERY 4 HOURS PRN
Qty: 2 TABLET | Refills: 0 | Status: SHIPPED | OUTPATIENT
Start: 2025-06-16 | End: 2025-06-18

## 2025-06-16 RX ORDER — KETOROLAC TROMETHAMINE 15 MG/ML
15 INJECTION, SOLUTION INTRAMUSCULAR; INTRAVENOUS ONCE
Status: COMPLETED | OUTPATIENT
Start: 2025-06-16 | End: 2025-06-16

## 2025-06-16 RX ORDER — HYDROMORPHONE HYDROCHLORIDE 1 MG/ML
0.5 INJECTION, SOLUTION INTRAMUSCULAR; INTRAVENOUS; SUBCUTANEOUS ONCE
Status: COMPLETED | OUTPATIENT
Start: 2025-06-16 | End: 2025-06-16

## 2025-06-16 RX ORDER — ACETAMINOPHEN 500 MG
500-1000 TABLET ORAL EVERY 6 HOURS PRN
Qty: 30 TABLET | Refills: 0 | Status: SHIPPED | OUTPATIENT
Start: 2025-06-16

## 2025-06-16 RX ADMIN — HYDROMORPHONE HYDROCHLORIDE 0.5 MG: 1 INJECTION, SOLUTION INTRAMUSCULAR; INTRAVENOUS; SUBCUTANEOUS at 02:00

## 2025-06-16 RX ADMIN — KETOROLAC TROMETHAMINE 15 MG: 15 INJECTION, SOLUTION INTRAMUSCULAR; INTRAVENOUS at 02:00

## 2025-06-16 RX ADMIN — ONDANSETRON 4 MG: 2 INJECTION INTRAMUSCULAR; INTRAVENOUS at 02:00

## 2025-06-16 RX ADMIN — SODIUM CHLORIDE, POTASSIUM CHLORIDE, SODIUM LACTATE AND CALCIUM CHLORIDE 1000 ML: 600; 310; 30; 20 INJECTION, SOLUTION INTRAVENOUS at 02:30

## 2025-06-16 ASSESSMENT — FIBROSIS 4 INDEX: FIB4 SCORE: 0.69

## 2025-06-16 NOTE — DISCHARGE INSTRUCTIONS
I would recommend taking 1 to 2 tablets of acetaminophen every 6 hours for the next 2 to 3 days.  You can take half a tablet of morphine as needed every 4 hours for severe pain.  Follow-up with your gynecologist on Friday.

## 2025-06-16 NOTE — ED TRIAGE NOTES
Chief Complaint   Patient presents with    Abdominal Pain     C/o lower abdominal pain and lower back pain, started this AM after starting menstrual cycle yesterday, c/o nausea; pt has hx of endometriosis      /80   Pulse 82   Temp 36.9 °C (98.5 °F) (Temporal)   Resp 18   Wt 65.2 kg (143 lb 11.8 oz)   LMP 06/15/2025 (Exact Date)   SpO2 98%   BMI 24.67 kg/m²     Pt arrived w/ above concern, BIB family, pt in w/c d/t pain and discomfort

## 2025-06-16 NOTE — ED NOTES
Pt resting in bed with equal chest rise and fall observed. No pt needs at this time. No s/s of distress noted.    Lights dimmed, pt reclined in bed

## 2025-06-16 NOTE — ED PROVIDER NOTES
ED Provider Note    CHIEF COMPLAINT  Chief Complaint   Patient presents with    Abdominal Pain     C/o lower abdominal pain and lower back pain, started this AM after starting menstrual cycle yesterday, c/o nausea; pt has hx of endometriosis        EXTERNAL RECORDS REVIEWED  Outpatient Notes patient seen in the ER 5/12/2025 with abdominal pain noting history of endometriosis and also also been seen at [facility] in the past with pain secondary to endometriosis.  At the time of this ER visit she had labs including a CBC and CMP that were unremarkable.  She was medicated with Toradol with significant symptom improvement.    HPI/ROS  LIMITATION TO HISTORY   Select: : None  OUTSIDE HISTORIAN(S):  Family daughter assisting with history as patient is in distress    Keerthi Pearl is a 44 y.o. female who presents to the ER for evaluation of lower abdominal pain.  Symptoms started yesterday morning and have been gradually worsening throughout the day yesterday and evening tonight keeping the patient up.  Patient notes a history of endometriosis and her period started today which typically results in severe exacerbation of pain.  She reports light vaginal bleeding consistent with her typical menses.  She notes that she had a endometrial ablation performed in [place] 2 months ago and was doing well up until about a month ago when she was seen in the ER with similar pain.  She notes that the medicine she had at that ER visit helped her significantly.  She reports nausea as well as dry heaving.  She has not had any fevers or chills, pain with urination, increased urinary frequency.  She denies possibility of pregnancy.    PAST MEDICAL HISTORY   Endometriosis    SURGICAL HISTORY   has a past surgical history that includes gastric bypass laparoscopic (2017).    FAMILY HISTORY  History reviewed. No pertinent family history.    SOCIAL HISTORY  Social History     Tobacco Use    Smoking status: Never    Smokeless tobacco:  Never   Vaping Use    Vaping status: Some Days    Substances: CBD    Devices: Disposable   Substance and Sexual Activity    Alcohol use: Yes     Comment: occasional    Drug use: Not Currently     Types: Inhaled     Comment: marijuana occasionally    Sexual activity: Not on file       CURRENT MEDICATIONS  Home Medications       Reviewed by Tamia Dey R.N. (Registered Nurse) on 06/16/25 at 0130  Med List Status: Not Addressed     Medication Last Dose Status        Patient Jb Taking any Medications                           ALLERGIES  Allergies[1]    PHYSICAL EXAM  VITAL SIGNS: /80   Pulse 60   Temp 36.9 °C (98.5 °F) (Temporal)   Resp 18   Wt 65.2 kg (143 lb 11.8 oz)   LMP 06/15/2025 (Exact Date)   SpO2 99%   BMI 24.67 kg/m²    Constitutional: Very anxious, writhing on the gurney clutching her lower abdomen, tearful and crying  HEENT: Atraumatic, normocephalic, pupils are equal round reactive to light, nose normal, mouth shows moist mucous membranes  Cardiovascular: Regular rate and rhythm, no murmur, rub or gallop, 2+ pulses peripherally x4  Thorax & Lungs: No respiratory distress, no wheezes, rales or rhonchi, no chest wall tenderness.  GI: Soft, non-distended, non-tender, no rebound  Skin: Warm, dry, no acute rash or lesion  Musculoskeletal: Moving all extremities, no acute deformity, no edema, no tenderness  Neurologic: A&Ox3, at baseline mentation  Psychiatric: Appropriate affect for situation at this time      EKG/LABS  Labs Reviewed   COMP METABOLIC PANEL - Abnormal; Notable for the following components:       Result Value    Co2 19 (*)     Bun 7 (*)     All other components within normal limits   CBC WITH DIFFERENTIAL   HCG QUAL SERUM   ESTIMATED GFR   URINALYSIS,CULTURE IF INDICATED         COURSE & MEDICAL DECISION MAKING    ASSESSMENT, COURSE AND PLAN  Care Narrative:     Patient with a longstanding history of chronic pelvic pain  associated with endometriosis, menorrhagia with  severe exacerbations of pain associated with her menses presenting on day one of her menstrual cycle with severe pelvic pain consistent with her prior endometriosis flares.  Presented to the ER about 1 month ago and had good clinical response to a dose of IV ketorolac therapy.  I discussed with the patient that while NSAIDs are typically not recommended in the setting of her gastric bypass this is an ideal medication for her symptoms, not habit-forming and I  feel the benefit of its use outweighs risk particularly in the setting of a single dose for her severe pain.  She is comfortable with its use and it was ordered for analgesia in addition to a single dose of IV hydromorphone and ondansetron for her nausea.  Gentle IV fluid resuscitation was provided as well given her recurrent vomiting and p.o. intolerance with evidence of dehydration.  Her presentation is highly suggestive of her chronic pain and endometriosis and I feel is less likely indicative of ovarian torsion or serious intra-abdominal pathology such as appendicitis, bowel obstruction, PID/TOA as she is not having any infectious type symptoms.  Will obtain basic labs to screen for any unexpected significant leukocytosis, pregnancy with related pathology.     Following treatment with the above patient reports significant symptom improvement and is resting comfortably now.  Will continue to monitor and follow-up results of lab testing.    Patient's labs are normal with no leukocytosis, anemia, MONICA or electrolyte abnormality and she is not pregnant.  She continues to deny any urinary symptoms and does not feel the need to urinate therefore will forego urinalysis.  She has an appointment with her gynecologist on Friday which I encouraged her to keep.  She is requesting some medication for pain at home and unfortunately has not able to take NSAIDs given her prior surgery as above.  Discussed that I will prescribe a short course of oral morphine IR for her for  use as needed until she can follow-up but otherwise recommended Tylenol extra strength every 6 hours.  Return precautions discussed and all questions answered and she was discharged in stable condition.    Narcotics Script: In prescribing controlled substances to this patient, I certify that I have obtained and reviewed the medical history of Keerthi Pearl. I have also made a good dionicio effort to obtain applicable records from other providers who have treated the patient and records did not demonstrate any increased risk of substance abuse that would prevent me from prescribing controlled substances.     I have conducted a physical exam and documented it. I have reviewed Ms. Jose Pearl’s prescription history as maintained by the [place] Prescription Monitoring Program.     I have assessed the patient’s risk for abuse, dependency, and addiction using the validated Opioid Risk Tool available at https://www.[employer].com/ipfrsb-tria-uphf-ort-narcotic-abuse.     Given the above, I believe the benefits of controlled substance therapy outweigh the risks. The reasons for prescribing controlled substances include non-narcotic, oral analgesic alternatives have been inadequate for pain control. Accordingly, I have discussed the risk and benefits, treatment plan, and alternative therapies with the patient.           ADDITIONAL PROBLEMS MANAGED  None    DISPOSITION AND DISCUSSIONS  I have discussed management of the patient with the following physicians and DELMA's: None    Discussion of management with other [facility] or appropriate source(s): None     Escalation of care considered, and ultimately not performed:diagnostic imaging    Decision tools and prescription drugs considered including, but not limited to: Pain Medications morphine IR.    FINAL DIAGNOSIS  1. Endometriosis    2. Lower abdominal pain         Electronically signed by: Armond Kramer M.D., 6/16/2025 1:35 AM           [1]   Allergies  Allergen Reactions     Nsaids      Hx gastric bypass